# Patient Record
Sex: FEMALE | Race: WHITE | NOT HISPANIC OR LATINO | URBAN - METROPOLITAN AREA
[De-identification: names, ages, dates, MRNs, and addresses within clinical notes are randomized per-mention and may not be internally consistent; named-entity substitution may affect disease eponyms.]

---

## 2017-01-24 ENCOUNTER — AMBULATORY - HEALTHEAST (OUTPATIENT)
Dept: ALLERGY | Facility: CLINIC | Age: 37
End: 2017-01-24

## 2017-01-24 DIAGNOSIS — J30.1 ALLERGIC RHINITIS DUE TO POLLEN, UNSPECIFIED RHINITIS SEASONALITY: ICD-10-CM

## 2017-02-21 ENCOUNTER — AMBULATORY - HEALTHEAST (OUTPATIENT)
Dept: ALLERGY | Facility: CLINIC | Age: 37
End: 2017-02-21

## 2017-02-21 DIAGNOSIS — J30.1 ALLERGIC RHINITIS DUE TO POLLEN, UNSPECIFIED RHINITIS SEASONALITY: ICD-10-CM

## 2017-03-21 ENCOUNTER — OFFICE VISIT - HEALTHEAST (OUTPATIENT)
Dept: ALLERGY | Facility: CLINIC | Age: 37
End: 2017-03-21

## 2017-03-21 DIAGNOSIS — J30.1 ALLERGIC RHINITIS DUE TO POLLEN, UNSPECIFIED RHINITIS SEASONALITY: ICD-10-CM

## 2017-03-21 ASSESSMENT — MIFFLIN-ST. JEOR: SCORE: 1379.42

## 2017-04-18 ENCOUNTER — AMBULATORY - HEALTHEAST (OUTPATIENT)
Dept: ALLERGY | Facility: CLINIC | Age: 37
End: 2017-04-18

## 2017-04-18 DIAGNOSIS — J30.1 ALLERGIC RHINITIS DUE TO POLLEN, UNSPECIFIED RHINITIS SEASONALITY: ICD-10-CM

## 2017-05-02 ENCOUNTER — AMBULATORY - HEALTHEAST (OUTPATIENT)
Dept: ALLERGY | Facility: CLINIC | Age: 37
End: 2017-05-02

## 2017-05-02 DIAGNOSIS — J30.1 ALLERGIC RHINITIS DUE TO POLLEN, UNSPECIFIED RHINITIS SEASONALITY: ICD-10-CM

## 2017-05-04 ENCOUNTER — AMBULATORY - HEALTHEAST (OUTPATIENT)
Dept: ALLERGY | Facility: CLINIC | Age: 37
End: 2017-05-04

## 2017-05-04 DIAGNOSIS — J30.1 ALLERGIC RHINITIS DUE TO POLLEN, UNSPECIFIED RHINITIS SEASONALITY: ICD-10-CM

## 2017-05-30 ENCOUNTER — AMBULATORY - HEALTHEAST (OUTPATIENT)
Dept: ALLERGY | Facility: CLINIC | Age: 37
End: 2017-05-30

## 2017-05-30 DIAGNOSIS — J30.1 ALLERGIC RHINITIS DUE TO POLLEN, UNSPECIFIED RHINITIS SEASONALITY: ICD-10-CM

## 2017-06-13 ENCOUNTER — AMBULATORY - HEALTHEAST (OUTPATIENT)
Dept: ALLERGY | Facility: CLINIC | Age: 37
End: 2017-06-13

## 2017-06-13 DIAGNOSIS — J30.1 ALLERGIC RHINITIS DUE TO POLLEN, UNSPECIFIED RHINITIS SEASONALITY: ICD-10-CM

## 2017-07-11 ENCOUNTER — AMBULATORY - HEALTHEAST (OUTPATIENT)
Dept: ALLERGY | Facility: CLINIC | Age: 37
End: 2017-07-11

## 2017-07-11 DIAGNOSIS — J30.1 ALLERGIC RHINITIS DUE TO POLLEN, UNSPECIFIED RHINITIS SEASONALITY: ICD-10-CM

## 2017-08-04 ENCOUNTER — AMBULATORY - HEALTHEAST (OUTPATIENT)
Dept: ALLERGY | Facility: CLINIC | Age: 37
End: 2017-08-04

## 2017-08-04 DIAGNOSIS — J30.1 ALLERGIC RHINITIS DUE TO POLLEN, UNSPECIFIED RHINITIS SEASONALITY: ICD-10-CM

## 2017-08-29 ENCOUNTER — AMBULATORY - HEALTHEAST (OUTPATIENT)
Dept: ALLERGY | Facility: CLINIC | Age: 37
End: 2017-08-29

## 2017-08-29 DIAGNOSIS — J30.1 ALLERGIC RHINITIS DUE TO POLLEN, UNSPECIFIED RHINITIS SEASONALITY: ICD-10-CM

## 2017-09-24 ENCOUNTER — COMMUNICATION - HEALTHEAST (OUTPATIENT)
Dept: ALLERGY | Facility: CLINIC | Age: 37
End: 2017-09-24

## 2017-09-26 ENCOUNTER — OFFICE VISIT - HEALTHEAST (OUTPATIENT)
Dept: ALLERGY | Facility: CLINIC | Age: 37
End: 2017-09-26

## 2017-09-26 DIAGNOSIS — J30.89 ALLERGIC RHINITIS DUE TO OTHER ALLERGEN: ICD-10-CM

## 2017-09-26 DIAGNOSIS — R05.9 COUGH: ICD-10-CM

## 2017-09-29 ENCOUNTER — OFFICE VISIT - HEALTHEAST (OUTPATIENT)
Dept: FAMILY MEDICINE | Facility: CLINIC | Age: 37
End: 2017-09-29

## 2017-09-29 ENCOUNTER — COMMUNICATION - HEALTHEAST (OUTPATIENT)
Dept: SCHEDULING | Facility: CLINIC | Age: 37
End: 2017-09-29

## 2017-09-29 DIAGNOSIS — R05.9 COUGH: ICD-10-CM

## 2017-09-29 DIAGNOSIS — J40 BRONCHITIS: ICD-10-CM

## 2017-09-29 RX ORDER — ALBUTEROL SULFATE 0.83 MG/ML
2.5 SOLUTION RESPIRATORY (INHALATION) EVERY 4 HOURS PRN
Qty: 25 VIAL | Refills: 0 | Status: SHIPPED | OUTPATIENT
Start: 2017-09-29

## 2017-10-03 ENCOUNTER — OFFICE VISIT - HEALTHEAST (OUTPATIENT)
Dept: INTERNAL MEDICINE | Facility: CLINIC | Age: 37
End: 2017-10-03

## 2017-10-03 ENCOUNTER — RECORDS - HEALTHEAST (OUTPATIENT)
Dept: GENERAL RADIOLOGY | Facility: CLINIC | Age: 37
End: 2017-10-03

## 2017-10-03 DIAGNOSIS — J98.8 WHEEZING-ASSOCIATED RESPIRATORY INFECTION (WARI): ICD-10-CM

## 2017-10-03 DIAGNOSIS — R05.9 COUGH: ICD-10-CM

## 2017-10-03 DIAGNOSIS — J98.8 OTHER SPECIFIED RESPIRATORY DISORDERS: ICD-10-CM

## 2017-10-03 DIAGNOSIS — J18.9 PNEUMONIA: ICD-10-CM

## 2017-10-06 ENCOUNTER — OFFICE VISIT - HEALTHEAST (OUTPATIENT)
Dept: INTERNAL MEDICINE | Facility: CLINIC | Age: 37
End: 2017-10-06

## 2017-10-06 DIAGNOSIS — J45.901 ASTHMA EXACERBATION: ICD-10-CM

## 2017-10-13 ENCOUNTER — OFFICE VISIT - HEALTHEAST (OUTPATIENT)
Dept: ALLERGY | Facility: CLINIC | Age: 37
End: 2017-10-13

## 2017-10-13 ENCOUNTER — OFFICE VISIT - HEALTHEAST (OUTPATIENT)
Dept: INTERNAL MEDICINE | Facility: CLINIC | Age: 37
End: 2017-10-13

## 2017-10-13 DIAGNOSIS — R05.9 COUGH: ICD-10-CM

## 2017-10-13 DIAGNOSIS — J45.40 MODERATE PERSISTENT ASTHMA WITHOUT COMPLICATION: ICD-10-CM

## 2017-10-24 ENCOUNTER — OFFICE VISIT - HEALTHEAST (OUTPATIENT)
Dept: ALLERGY | Facility: CLINIC | Age: 37
End: 2017-10-24

## 2017-10-24 DIAGNOSIS — J30.9 ALLERGIC RHINITIS: ICD-10-CM

## 2017-10-24 DIAGNOSIS — J45.40 MODERATE PERSISTENT ASTHMA WITHOUT COMPLICATION: ICD-10-CM

## 2017-10-31 ENCOUNTER — AMBULATORY - HEALTHEAST (OUTPATIENT)
Dept: ALLERGY | Facility: CLINIC | Age: 37
End: 2017-10-31

## 2017-10-31 DIAGNOSIS — J30.9 ALLERGIC RHINITIS: ICD-10-CM

## 2017-11-28 ENCOUNTER — AMBULATORY - HEALTHEAST (OUTPATIENT)
Dept: ALLERGY | Facility: CLINIC | Age: 37
End: 2017-11-28

## 2017-11-28 DIAGNOSIS — J30.9 ALLERGIC RHINITIS: ICD-10-CM

## 2017-12-19 ENCOUNTER — AMBULATORY - HEALTHEAST (OUTPATIENT)
Dept: ALLERGY | Facility: CLINIC | Age: 37
End: 2017-12-19

## 2017-12-19 DIAGNOSIS — J30.9 ALLERGIC RHINITIS: ICD-10-CM

## 2018-01-16 ENCOUNTER — AMBULATORY - HEALTHEAST (OUTPATIENT)
Dept: ALLERGY | Facility: CLINIC | Age: 38
End: 2018-01-16

## 2018-01-16 DIAGNOSIS — J30.1 ALLERGIC RHINITIS DUE TO POLLEN, UNSPECIFIED CHRONICITY, UNSPECIFIED SEASONALITY: ICD-10-CM

## 2018-02-06 ENCOUNTER — COMMUNICATION - HEALTHEAST (OUTPATIENT)
Dept: ALLERGY | Facility: CLINIC | Age: 38
End: 2018-02-06

## 2018-02-16 ENCOUNTER — AMBULATORY - HEALTHEAST (OUTPATIENT)
Dept: ALLERGY | Facility: CLINIC | Age: 38
End: 2018-02-16

## 2018-02-16 DIAGNOSIS — J30.1 ALLERGIC RHINITIS DUE TO POLLEN, UNSPECIFIED CHRONICITY, UNSPECIFIED SEASONALITY: ICD-10-CM

## 2018-02-23 ENCOUNTER — COMMUNICATION - HEALTHEAST (OUTPATIENT)
Dept: ALLERGY | Facility: CLINIC | Age: 38
End: 2018-02-23

## 2018-03-16 ENCOUNTER — OFFICE VISIT - HEALTHEAST (OUTPATIENT)
Dept: ALLERGY | Facility: CLINIC | Age: 38
End: 2018-03-16

## 2018-03-16 DIAGNOSIS — J45.20 MILD INTERMITTENT ASTHMA WITHOUT COMPLICATION: ICD-10-CM

## 2018-03-16 DIAGNOSIS — J30.1 ALLERGIC RHINITIS DUE TO POLLEN, UNSPECIFIED CHRONICITY, UNSPECIFIED SEASONALITY: ICD-10-CM

## 2018-03-16 ASSESSMENT — MIFFLIN-ST. JEOR: SCORE: 1415.71

## 2018-04-16 ENCOUNTER — AMBULATORY - HEALTHEAST (OUTPATIENT)
Dept: ALLERGY | Facility: CLINIC | Age: 38
End: 2018-04-16

## 2018-04-16 DIAGNOSIS — J30.1 ALLERGIC RHINITIS DUE TO POLLEN, UNSPECIFIED CHRONICITY, UNSPECIFIED SEASONALITY: ICD-10-CM

## 2018-07-11 ENCOUNTER — OFFICE VISIT - HEALTHEAST (OUTPATIENT)
Dept: FAMILY MEDICINE | Facility: CLINIC | Age: 38
End: 2018-07-11

## 2018-07-11 DIAGNOSIS — R10.32 LLQ ABDOMINAL PAIN: ICD-10-CM

## 2018-07-11 LAB
ALBUMIN UR-MCNC: NEGATIVE MG/DL
APPEARANCE UR: CLEAR
BACTERIA #/AREA URNS HPF: ABNORMAL HPF
BASOPHILS # BLD AUTO: 0.1 THOU/UL (ref 0–0.2)
BASOPHILS NFR BLD AUTO: 1 % (ref 0–2)
BILIRUB UR QL STRIP: NEGATIVE
COLOR UR AUTO: YELLOW
EOSINOPHIL # BLD AUTO: 0.2 THOU/UL (ref 0–0.4)
EOSINOPHIL NFR BLD AUTO: 2 % (ref 0–6)
ERYTHROCYTE [DISTWIDTH] IN BLOOD BY AUTOMATED COUNT: 11.7 % (ref 11–14.5)
GLUCOSE UR STRIP-MCNC: NEGATIVE MG/DL
HCG UR QL: NEGATIVE
HCT VFR BLD AUTO: 41.1 % (ref 35–47)
HGB BLD-MCNC: 13.7 G/DL (ref 12–16)
HGB UR QL STRIP: ABNORMAL
KETONES UR STRIP-MCNC: NEGATIVE MG/DL
LEUKOCYTE ESTERASE UR QL STRIP: NEGATIVE
LYMPHOCYTES # BLD AUTO: 2.9 THOU/UL (ref 0.8–4.4)
LYMPHOCYTES NFR BLD AUTO: 23 % (ref 20–40)
MCH RBC QN AUTO: 29.2 PG (ref 27–34)
MCHC RBC AUTO-ENTMCNC: 33.4 G/DL (ref 32–36)
MCV RBC AUTO: 88 FL (ref 80–100)
MONOCYTES # BLD AUTO: 1.1 THOU/UL (ref 0–0.9)
MONOCYTES NFR BLD AUTO: 9 % (ref 2–10)
NEUTROPHILS # BLD AUTO: 8.4 THOU/UL (ref 2–7.7)
NEUTROPHILS NFR BLD AUTO: 66 % (ref 50–70)
NITRATE UR QL: NEGATIVE
PH UR STRIP: 6 [PH] (ref 5–8)
PLATELET # BLD AUTO: 429 THOU/UL (ref 140–440)
PMV BLD AUTO: 7 FL (ref 7–10)
RBC # BLD AUTO: 4.69 MILL/UL (ref 3.8–5.4)
RBC #/AREA URNS AUTO: ABNORMAL HPF
SP GR UR STRIP: <=1.005 (ref 1–1.03)
SP GR UR STRIP: <=1.005 (ref 1–1.03)
SQUAMOUS #/AREA URNS AUTO: ABNORMAL LPF
UROBILINOGEN UR STRIP-ACNC: ABNORMAL
WBC #/AREA URNS AUTO: ABNORMAL HPF
WBC: 12.8 THOU/UL (ref 4–11)

## 2018-07-12 ENCOUNTER — OFFICE VISIT - HEALTHEAST (OUTPATIENT)
Dept: FAMILY MEDICINE | Facility: CLINIC | Age: 38
End: 2018-07-12

## 2018-07-12 ENCOUNTER — HOSPITAL ENCOUNTER (OUTPATIENT)
Dept: CT IMAGING | Facility: CLINIC | Age: 38
Discharge: HOME | End: 2018-07-12

## 2018-07-12 DIAGNOSIS — K57.30 DIVERTICULOSIS OF LARGE INTESTINE: ICD-10-CM

## 2018-07-12 DIAGNOSIS — R10.32 LLQ ABDOMINAL PAIN: ICD-10-CM

## 2018-10-11 ENCOUNTER — COMMUNICATION - HEALTHEAST (OUTPATIENT)
Dept: ALLERGY | Facility: CLINIC | Age: 38
End: 2018-10-11

## 2018-10-11 DIAGNOSIS — R05.9 COUGH: ICD-10-CM

## 2018-10-15 RX ORDER — ALBUTEROL SULFATE 90 UG/1
AEROSOL, METERED RESPIRATORY (INHALATION)
Qty: 18 INHALER | Refills: 0 | Status: SHIPPED | OUTPATIENT
Start: 2018-10-15

## 2018-12-21 ENCOUNTER — OFFICE VISIT - HEALTHEAST (OUTPATIENT)
Dept: FAMILY MEDICINE | Facility: CLINIC | Age: 38
End: 2018-12-21

## 2018-12-21 DIAGNOSIS — R07.0 THROAT PAIN: ICD-10-CM

## 2018-12-21 LAB — DEPRECATED S PYO AG THROAT QL EIA: NORMAL

## 2018-12-22 LAB — GROUP A STREP BY PCR: NORMAL

## 2018-12-27 ENCOUNTER — OFFICE VISIT - HEALTHEAST (OUTPATIENT)
Dept: FAMILY MEDICINE | Facility: CLINIC | Age: 38
End: 2018-12-27

## 2018-12-27 DIAGNOSIS — H65.02 ACUTE SEROUS OTITIS MEDIA OF LEFT EAR, RECURRENCE NOT SPECIFIED: ICD-10-CM

## 2018-12-29 ENCOUNTER — OFFICE VISIT - HEALTHEAST (OUTPATIENT)
Dept: FAMILY MEDICINE | Facility: CLINIC | Age: 38
End: 2018-12-29

## 2018-12-29 DIAGNOSIS — H65.92 OME (OTITIS MEDIA WITH EFFUSION), LEFT: ICD-10-CM

## 2019-03-19 ENCOUNTER — RECORDS - HEALTHEAST (OUTPATIENT)
Dept: LAB | Facility: CLINIC | Age: 39
End: 2019-03-19

## 2019-03-20 LAB
CHOLEST SERPL-MCNC: 174 MG/DL
FASTING STATUS PATIENT QL REPORTED: NO
HDLC SERPL-MCNC: 52 MG/DL
LDLC SERPL CALC-MCNC: 66 MG/DL
TRIGL SERPL-MCNC: 278 MG/DL

## 2019-03-21 LAB
HPV SOURCE: NORMAL
HUMAN PAPILLOMA VIRUS 16 DNA: NEGATIVE
HUMAN PAPILLOMA VIRUS 18 DNA: NEGATIVE
HUMAN PAPILLOMA VIRUS FINAL DIAGNOSIS: NORMAL
HUMAN PAPILLOMA VIRUS OTHER HR: NEGATIVE
SPECIMEN DESCRIPTION: NORMAL

## 2019-03-27 LAB
BKR LAB AP ABNORMAL BLEEDING: NO
BKR LAB AP BIRTH CONTROL/HORMONES: NORMAL
BKR LAB AP CERVICAL APPEARANCE: NORMAL
BKR LAB AP GYN ADEQUACY: NORMAL
BKR LAB AP GYN INTERPRETATION: NORMAL
BKR LAB AP HPV REFLEX: NORMAL
BKR LAB AP LMP: NORMAL
BKR LAB AP PATIENT STATUS: NO
BKR LAB AP PREVIOUS ABNORMAL: NO
BKR LAB AP PREVIOUS NORMAL: NORMAL
HIGH RISK?: NO
PATH REPORT.COMMENTS IMP SPEC: NORMAL
RESULT FLAG (HE HISTORICAL CONVERSION): NORMAL

## 2020-09-02 ENCOUNTER — AMBULATORY - HEALTHEAST (OUTPATIENT)
Dept: NURSING | Facility: CLINIC | Age: 40
End: 2020-09-02

## 2021-05-30 VITALS — WEIGHT: 161 LBS | BODY MASS INDEX: 28.53 KG/M2 | HEIGHT: 63 IN

## 2021-05-31 VITALS — WEIGHT: 165 LBS | BODY MASS INDEX: 29.23 KG/M2

## 2021-05-31 VITALS — WEIGHT: 164 LBS | BODY MASS INDEX: 29.05 KG/M2

## 2021-05-31 VITALS — WEIGHT: 165.4 LBS | BODY MASS INDEX: 29.3 KG/M2

## 2021-05-31 VITALS — WEIGHT: 163 LBS | BODY MASS INDEX: 28.87 KG/M2

## 2021-05-31 VITALS — WEIGHT: 161 LBS | BODY MASS INDEX: 28.52 KG/M2

## 2021-06-01 VITALS — HEIGHT: 63 IN | BODY MASS INDEX: 29.95 KG/M2 | WEIGHT: 169 LBS

## 2021-06-01 VITALS — BODY MASS INDEX: 30.63 KG/M2 | WEIGHT: 172.9 LBS

## 2021-06-01 VITALS — BODY MASS INDEX: 30.47 KG/M2 | WEIGHT: 172 LBS

## 2021-06-02 VITALS — BODY MASS INDEX: 31.09 KG/M2 | WEIGHT: 175.5 LBS

## 2021-06-02 VITALS — WEIGHT: 178 LBS | BODY MASS INDEX: 31.53 KG/M2

## 2021-06-02 VITALS — WEIGHT: 171 LBS | BODY MASS INDEX: 30.29 KG/M2

## 2021-06-09 NOTE — PROGRESS NOTES
"Chief complaint: Follow-up allergy shots    History of present illness: This is a pleasant 36-year-old woman who is here today for follow-up of allergy shots.  She has been on allergy shots for 3 years.  She has not had a systemic reaction in almost 2 years.  She does not use allergy medication regularly but does have Claritin and Singulair to use at home in case symptoms were to return.  She continues to have some baseline nasal congestion but does not use any medication for this.  She denies any cough, wheeze or shortness of breath.  Symptoms have improved where she was a few years ago, however.    Past medical history, social history, family medical history, meds and allergies reviewed and updated accordingly.      Review of Systems performed as above and the remainder is negative.        Current Outpatient Prescriptions:      DESONIDE (DESONATE TOP), Apply topically., Disp: , Rfl:      EPINEPHrine (EPIPEN 2-SARAY) 0.3 mg/0.3 mL (1:1,000) atIn, MICHAEL   Inject intramuscularly as directed, Disp: , Rfl:      GLUCOSAMINE HCL/MSM (GLUCOSAMINE MSM ORAL), Take 1 tablet by mouth., Disp: , Rfl:      hydrocortisone-pramoxine (ANALPRAM-HC) 2.5-1 % rectal cream, , Disp: , Rfl:      loratadine (CLARITIN) 10 mg tablet, Take 10 mg by mouth as needed for allergies., Disp: , Rfl:      montelukast (SINGULAIR) 10 mg tablet, Take 1 tablet (10 mg total) by mouth bedtime., Disp: 30 tablet, Rfl: 5     tacrolimus (PROTOPIC) 0.03 % ointment, , Disp: , Rfl: 4     terbinafine HCl (LAMISIL) 250 mg tablet, , Disp: , Rfl: 0     triamcinolone (KENALOG) 0.1 % ointment, , Disp: , Rfl: 1    Current Facility-Administered Medications:      albuterol nebulizer solution 3 mL (PROVENTIL), 3 mL, Nebulization, Once, Dora Franco MD    No Known Allergies    Visit Vitals     Resp 18     Ht 5' 3\" (1.6 m)     Wt 161 lb (73 kg)     BMI 28.52 kg/m2     Gen:  Pleasant female not in acute distress  HEENT:  Eyes no erythema of the bulbar or palpebral " conjunctiva, no edema.  Ears:  TMs well visualized, no effusions.  Nose:  Clear, no congestion, normal mucosa Mouth:  Throat clear, no lip or tongue edema.    Cardiac:  Regular rate and rhythm, no murmurs, rubs or gallops  Respiratory:  Clear to auscultation bilaterally, no adventitious breath sounds    Skin:  No rashes or lesions  Psych:  Alert and oriented times 3    Impression report and plan:  1.  Allergic rhinitis    Continue allergy shots.  Follow-up in 1 year.  The importance of not taking 56 day breaks from allergy shots was discussed.  Notify of systemic reaction.  Okay to restart Singulair if needed.  Otherwise, consider nasal rinses.

## 2021-06-10 NOTE — PROGRESS NOTES
DFM/DPM/RW  1:1 V/V EXP 5/4/2018  POLLENS/CAT  1:1 V/V EXP 5/4/2018  TREES  1:1 V/V EXP 5/4/2018    HE WBY  CHECKED BY HOLGER  CHARGED 150 UNITS- 0.1 H.D.

## 2021-06-13 NOTE — PROGRESS NOTES
ASSESSMENT and PLAN:  1. Cough  Her cough continues.  I believe I see an infiltrate in the right lower lung field on chest x-ray.  We will treat her for pneumonia as well.  I am also adding a steroid inhaler.  - XR Chest PA and Lateral; Future  - fluticasone (FLOVENT HFA) 110 mcg/actuation inhaler; Inhale 2 puffs 2 (two) times a day.  Dispense: 1 Inhaler; Refill: 1    2. Wheezing-associated respiratory infection (WARI)  I am adding a steroid inhaler.  We will plan short-term follow-up.  We discussed signs and symptoms that would require urgent reevaluation.  - XR Chest PA and Lateral; Future  - fluticasone (FLOVENT HFA) 110 mcg/actuation inhaler; Inhale 2 puffs 2 (two) times a day.  Dispense: 1 Inhaler; Refill: 1    3. Pneumonia  I reviewed the x-ray.  I believe I see a small infiltrate in the right lower lobe.  We will treat her for pneumonia.  She has been treated with azithromycin.  I will use Levaquin.  She has a Mirena IUD so this should not be a problem for her.  She will continue using her albuterol as needed and I am adding Flovent as well.  We plan for short-term follow-up.  - levoFLOXacin (LEVAQUIN) 500 MG tablet; Take 1 tablet (500 mg total) by mouth daily for 7 days.  Dispense: 7 tablet; Refill: 0      There are no discontinued medications.  Administrations This Visit     albuterol nebulizer solution 3 mL (PROVENTIL)     Admin Date Action Dose Route Administered By             10/03/2017 Given 3 mL Nebulization Katerine Antonio CMA                        No Follow-up on file.    Patient Instructions   I sent in Levaquin, and antibiotic for you.  It is once a day for 7 days.    I am adding Flovent to your medication regimen.  It is an inhaler.  He take it twice daily.  You can continue to use her albuterol inhaler or nebulizer as needed as well.    If at any time, you are feeling worse than either get an appointment or go to the emergency room or call 911 depending how severe your symptoms are.    Please  follow-up with either me, Vikram Eubanks (our internal medicine nurse practitioner), or Dr. Franco.  I would like you seen later this week or early next week.      CHIEF COMPLAINT:  Chief Complaint   Patient presents with     Cough     recently treated not improving, shortness of breath worsening       HISTORY OF PRESENT ILLNESS:  Lori J Foix is a 36 y.o. female  presenting to the clinic today for a cough.  She saw Dr. Franco was  for this.  She was treated w/ oral prednisone.  She was seen at the [facility] on  for this.  She was treated w/ albuterol nebs and a zpak.   I reviewed both of those notes.  She has not gotten better, in fact she is getting worse.  She feels very short of breath.  Her chest feels tight.  She does not have any pleuritic pain.  She is having trouble sleeping.  This is affecting her at work.  She mentions that she was written up at work and she fears she may lose her job.  An added stressor is that her father-in-law  last week.  She had never met him.  She and her  had to put his affairs in order.  They have some of his paperwork in her house and it smells of smoke.  Her son was ill with similar symptoms.  When she takes her albuterol nebulizer, it helps her for a short time.    She does not have a long history of asthma.  She said that she was diagnosed with wheezing prior to a pregnancy.  She has never been intubated for asthma.    REVIEW OF SYSTEMS:   Pscyh: she's feeling stressed by her work   All other systems are negative.    PFSH:  She's   She has a Mirena IUD for contraception      TOBACCO USE:  History   Smoking Status     Never Smoker   Smokeless Tobacco     Never Used       VITALS:  Vitals:    10/03/17 1259   BP: 108/62   Patient Site: Right Arm   Patient Position: Sitting   Cuff Size: Adult Regular   Pulse: (!) 125   Temp: 98.5  F (36.9  C)   TempSrc: Oral   SpO2: 98%   Weight: 161 lb (73 kg)     Wt Readings from Last 3 Encounters:   10/03/17 161 lb (73 kg)   17  163 lb (73.9 kg)   03/21/17 161 lb (73 kg)         PHYSICAL EXAM:  Constitutional:  Reveals an alert, pleasant adult female.,  She appears fatigued  Vitals:  Noted.   Lungs: Coarse breath sounds, worse in the left lower lung fields.  She has expiratory wheezing throughout.  Deep breathing provokes a cough.  Her exam was improved following administration of an albuterol nebulizer.   Heart: Tachycardic rate and normal rhythm, S1 and S2 normal, no murmur, rub, or gallop,   Extremities: Extremities normal, atraumatic, no cyanosis or edema   Skin: Skin color, texture, turgor normal, no rashes or lesions on exposed skin    QUALITY MEASURES:  The following high BMI interventions were performed this visit: weight monitoring    MEDICATIONS:  Current Outpatient Prescriptions   Medication Sig Dispense Refill     albuterol (PROAIR HFA;PROVENTIL HFA;VENTOLIN HFA) 90 mcg/actuation inhaler Inhale 2 puffs every 6 (six) hours as needed for wheezing. 1 Inhaler 0     albuterol (PROVENTIL) 2.5 mg /3 mL (0.083 %) nebulizer solution Take 3 mL (2.5 mg total) by nebulization every 4 (four) hours as needed for wheezing. 25 vial 0     azithromycin (ZITHROMAX Z-SARAY) 250 MG tablet Take 2 tablets (500 mg) on  Day 1,  followed by 1 tablet (250 mg) once daily on Days 2 through 5. 6 tablet 0     benzonatate (TESSALON) 200 MG capsule Take 1 capsule (200 mg total) by mouth 3 (three) times a day as needed for cough. 30 capsule 0     DESONIDE (DESONATE TOP) Apply topically.       EPINEPHrine (EPIPEN 2-SARAY) 0.3 mg/0.3 mL (1:1,000) atIn MICHAEL    Inject intramuscularly as directed       GLUCOSAMINE HCL/MSM (GLUCOSAMINE MSM ORAL) Take 1 tablet by mouth.       hydrocortisone-pramoxine (ANALPRAM-HC) 2.5-1 % rectal cream        loratadine (CLARITIN) 10 mg tablet Take 10 mg by mouth as needed for allergies.       montelukast (SINGULAIR) 10 mg tablet Take 1 tablet (10 mg total) by mouth bedtime. 30 tablet 5     tacrolimus (PROTOPIC) 0.03 % ointment   4      terbinafine HCl (LAMISIL) 250 mg tablet   0     triamcinolone (KENALOG) 0.1 % ointment   1     fluticasone (FLOVENT HFA) 110 mcg/actuation inhaler Inhale 2 puffs 2 (two) times a day. 1 Inhaler 1     levoFLOXacin (LEVAQUIN) 500 MG tablet Take 1 tablet (500 mg total) by mouth daily for 7 days. 7 tablet 0     Current Facility-Administered Medications   Medication Dose Route Frequency Provider Last Rate Last Dose     albuterol nebulizer solution 3 mL (PROVENTIL)  3 mL Nebulization Once Lisa Betancourt MD

## 2021-06-13 NOTE — PROGRESS NOTES
Chief complaint: Cough    History of present illness: This is a pleasant 36-year-old woman with history of allergic rhinitis is currently on allergen immunotherapy is here today and she has been sick for the last few weeks with cough.  She states that her son was also sick.  She has had a persistent cough.  She states it is worse at night.  She is noted in the morning as well.  She will have coughing episodes.  She is not sure she is short of breath but feels that she does have some tightness in her chest.  She denies wheezing.  She is coughing in her sleep some.  She states a few years ago we had given her an albuterol inhaler.  She had a similar illness at that time and the inhaler helps to recover.  She does not have a current albuterol inhaler.  She continues on Claritin.  She notes that she has not had any itchy eyes or sneezing.  No fevers.    Past medical history, social history, family medical history, meds and allergies reviewed and updated accordingly.    Review of Systems performed as above and the remainder is negative.        Current Outpatient Prescriptions:      DESONIDE (DESONATE TOP), Apply topically., Disp: , Rfl:      EPINEPHrine (EPIPEN 2-SARAY) 0.3 mg/0.3 mL (1:1,000) atIn, MICHAEL   Inject intramuscularly as directed, Disp: , Rfl:      GLUCOSAMINE HCL/MSM (GLUCOSAMINE MSM ORAL), Take 1 tablet by mouth., Disp: , Rfl:      hydrocortisone-pramoxine (ANALPRAM-HC) 2.5-1 % rectal cream, , Disp: , Rfl:      loratadine (CLARITIN) 10 mg tablet, Take 10 mg by mouth as needed for allergies., Disp: , Rfl:      montelukast (SINGULAIR) 10 mg tablet, Take 1 tablet (10 mg total) by mouth bedtime., Disp: 30 tablet, Rfl: 5     tacrolimus (PROTOPIC) 0.03 % ointment, , Disp: , Rfl: 4     terbinafine HCl (LAMISIL) 250 mg tablet, , Disp: , Rfl: 0     triamcinolone (KENALOG) 0.1 % ointment, , Disp: , Rfl: 1     albuterol (PROAIR HFA;PROVENTIL HFA;VENTOLIN HFA) 90 mcg/actuation inhaler, Inhale 2 puffs every 6 (six) hours  as needed for wheezing., Disp: 1 Inhaler, Rfl: 0     predniSONE (DELTASONE) 20 MG tablet, Take 1 tablet (20 mg total) by mouth 2 (two) times a day for 5 days., Disp: 10 tablet, Rfl: 0    Current Facility-Administered Medications:      albuterol nebulizer solution 3 mL (PROVENTIL), 3 mL, Nebulization, Once, Dora Franco MD    No Known Allergies    /90  Pulse (!) 106  Resp 14  Gen: Pleasant female not in acute distress  HEENT: Eyes no erythema of the bulbar or palpebral conjunctiva, no edema. Ears: TMs well visualized, no effusions. Nose: No congestion, mucosa normal. Mouth: Throat clear, no lip or tongue edema.   Cardiac: Regular rate and rhythm, no murmurs, rubs or gallops  Respiratory: Clear to auscultation bilaterally, no adventitious breath sounds    Skin: No rashes or lesions  Psych: Alert and oriented times 3    FENO:  23    Impression report and plan:  1.  Cough  2.  Allergic rhinitis  3.  Upper respiratory tract illness    Would like to give her prednisone 20 mg twice daily for 3-5 days.  I cautioned her to the side effects of prednisone.  I would also give her an albuterol inhaler to use every 4 hours as needed.  If she is not noting much improvement in symptoms by Friday, add a controller medication.  Follow as previously scheduled.  We denied her allergy shot today.

## 2021-06-13 NOTE — PROGRESS NOTES
Clinic Note    Assessment:     Assessment and Plan:    1. Asthma exacerbation    Pt has audible expiratory wheezing on exam. No rales. I think given her hx of asthma and allergies as well as her negative CXR that she is dealing with an asthma flare. Going to increase the dose of prednisone for 60mg burst for five days while she continues the Flovent and Albuterol Nebs. Told her to use OTC flonase as well. Gave her a note for work today so that she could rest. Want to see her next week for follow-up.        Patient Instructions   Buy Flonase over the counter and use as directed on the label.     Take the prednisone for five days.     Consider using a martínez-pot again.     Use your nebulizer twice per day.     Take three tablets of prednisone daily for five days.              Subjective:      Lori J Foix is a 36 y.o. female who is here with a cough. She was seen by Dr. Lisa Betancourt on 10/03 and started on Flovent and Levaquin after Dr. Betancourt suspected a possible pneumonia after ordering a chest x-ray. Patient says that since that visit she feels only modestly better. Only getting two hours of sleep per night. Feels a lot of drainage down back of throat. Having some wheezing. Says that she was diagnosed with asthma when she was younger. Lots of seasonal allergies. No fevers. Cough is productive. No chest pain. No nausea vomiting or diarrhea.     Has been using the Flovent as well as Albuterol Nebulizer. Says that she saw Dr. Franco a couple of weeks ago and was given steroid pill. The cough has been going on for about three weeks now.     The following portions of the patient's history were reviewed and updated as appropriate: Allergies, Medications, Problem List.     Review of Systems:    Review is negative except for what is mentioned above.     Social Hx:    History   Smoking Status     Never Smoker   Smokeless Tobacco     Never Used         Objective:     Vitals:    10/06/17 0901   BP: 124/76   Patient Site: Right  Arm   Patient Position: Sitting   Cuff Size: Adult Regular   Pulse: 92   Temp: 98.5  F (36.9  C)   TempSrc: Oral   SpO2: 97%   Weight: 164 lb (74.4 kg)       Exam:    General: Mild distress. Calm. Alert and Oriented X3. Pt behavior is appropriate.  Head:Atraumatic. Normocephalic, non-tender to palpation  Neck: Supple. No JVD. Full ROM. No adenopathy  Eyes: PERRL, No discharge. No strabismus. No nystagmus.  Ears: TMs pearly gray with landmarks visible.   Nose/Mouth/Throat: Patent nares, no oral lesions, pharynx clear and without exudate. Uvula mid-line. Nasal septum mid-line. Clear turbinates.   Lymph: No axillar or cervical adenopathy.   Chest/Lungs: Normal chest wall, expiratory wheezes in bilateral lower lobes, no rales, normal respiratory effort and rate.   Heart/Pulses: Regular rate and rhythm, strong and equal radial pulses, no murmurs. Capillary refill <2 seconds. No edema.   Musculoskeletal: No CVA tenderness with palpation. Good ROM with extremities.   Neurologic: Interactive, alert, no focal findings, CNs intact.   Skin: Warm, dry. Normal hair pattern. Free of lesions. Normal skin turgor.       Patient Active Problem List   Diagnosis     Allergic Rhinitis     Anxiety     Current Outpatient Prescriptions   Medication Sig Dispense Refill     albuterol (PROAIR HFA;PROVENTIL HFA;VENTOLIN HFA) 90 mcg/actuation inhaler Inhale 2 puffs every 6 (six) hours as needed for wheezing. 1 Inhaler 0     albuterol (PROVENTIL) 2.5 mg /3 mL (0.083 %) nebulizer solution Take 3 mL (2.5 mg total) by nebulization every 4 (four) hours as needed for wheezing. 25 vial 0     benzonatate (TESSALON) 200 MG capsule Take 1 capsule (200 mg total) by mouth 3 (three) times a day as needed for cough. 30 capsule 0     DESONIDE (DESONATE TOP) Apply topically.       EPINEPHrine (EPIPEN 2-SARAY) 0.3 mg/0.3 mL (1:1,000) atIn MICHAEL    Inject intramuscularly as directed       fluticasone (FLOVENT HFA) 110 mcg/actuation inhaler Inhale 2 puffs 2 (two)  times a day. 1 Inhaler 1     GLUCOSAMINE HCL/MSM (GLUCOSAMINE MSM ORAL) Take 1 tablet by mouth.       hydrocortisone-pramoxine (ANALPRAM-HC) 2.5-1 % rectal cream        levoFLOXacin (LEVAQUIN) 500 MG tablet Take 1 tablet (500 mg total) by mouth daily for 7 days. 7 tablet 0     loratadine (CLARITIN) 10 mg tablet Take 10 mg by mouth as needed for allergies.       tacrolimus (PROTOPIC) 0.03 % ointment   4     terbinafine HCl (LAMISIL) 250 mg tablet   0     triamcinolone (KENALOG) 0.1 % ointment   1     montelukast (SINGULAIR) 10 mg tablet Take 1 tablet (10 mg total) by mouth bedtime. 30 tablet 5     Current Facility-Administered Medications   Medication Dose Route Frequency Provider Last Rate Last Dose     albuterol nebulizer solution 3 mL (PROVENTIL)  3 mL Nebulization Once Lisa Betancourt MD           Total time spent with patient was 20 minutes with >50% of time spent in face-to-face counseling regarding the above plan       Darryl Eubanks CNP (Rob)    10/6/2017

## 2021-06-13 NOTE — PROGRESS NOTES
Chief complaint: Follow-up asthma    History of present illness: This is a pleasant 37-year-old woman who is here today for follow-up of asthma.  I saw her almost 2 weeks ago.  At that time started her on Dulera 200-5 2 puffs twice daily.  She reports she still has a slight cough but symptoms have significantly improved.  She has less nasal congestion and drainage.  She continues on Flonase.  She is here today to possibly get an allergy shot.  She has not had a shot since the end of August due to her illness.  She is still using her albuterol inhaler.  She used it twice yesterday.  She reports the use has decreased quite a bit.    Past medical history, social history, family medical history, meds and allergies reviewed and updated accordingly.    Review of Systems performed as above and the remainder is negative.      Current Outpatient Prescriptions:      albuterol (PROAIR HFA;PROVENTIL HFA;VENTOLIN HFA) 90 mcg/actuation inhaler, Inhale 2 puffs every 6 (six) hours as needed for wheezing., Disp: 1 Inhaler, Rfl: 0     albuterol (PROVENTIL) 2.5 mg /3 mL (0.083 %) nebulizer solution, Take 3 mL (2.5 mg total) by nebulization every 4 (four) hours as needed for wheezing., Disp: 25 vial, Rfl: 0     DESONIDE (DESONATE TOP), Apply topically., Disp: , Rfl:      EPINEPHrine (EPIPEN 2-SARAY) 0.3 mg/0.3 mL (1:1,000) atIn, MICHAEL   Inject intramuscularly as directed, Disp: , Rfl:      GLUCOSAMINE HCL/MSM (GLUCOSAMINE MSM ORAL), Take 1 tablet by mouth., Disp: , Rfl:      hydrocortisone-pramoxine (ANALPRAM-HC) 2.5-1 % rectal cream, , Disp: , Rfl:      loratadine (CLARITIN) 10 mg tablet, Take 10 mg by mouth as needed for allergies., Disp: , Rfl:      mometasone-formoterol (DULERA) 200-5 mcg/actuation HFAA inhaler, Inhale 2 puffs 2 (two) times a day., Disp: 1 Inhaler, Rfl: 1     montelukast (SINGULAIR) 10 mg tablet, Take 1 tablet (10 mg total) by mouth bedtime., Disp: 30 tablet, Rfl: 5     tacrolimus (PROTOPIC) 0.03 % ointment, , Disp: ,  Rfl: 4     terbinafine HCl (LAMISIL) 250 mg tablet, , Disp: , Rfl: 0     triamcinolone (KENALOG) 0.1 % ointment, , Disp: , Rfl: 1    Current Facility-Administered Medications:      albuterol nebulizer solution 3 mL (PROVENTIL), 3 mL, Nebulization, Once, Lisa Betancourt MD    No Known Allergies    /70  Pulse 80  Resp 17  Gen: Pleasant female not in acute distress  HEENT: Eyes no erythema of the bulbar or palpebral conjunctiva, no edema. Ears: TMs well visualized, no effusions. Nose: No congestion, mucosa normal. Mouth: Throat clear, no lip or tongue edema.   Cardiac: Regular rate and rhythm, no murmurs, rubs or gallops  Respiratory: Clear to auscultation bilaterally, no adventitious breath sounds    Skin: No rashes or lesions  Psych: Alert and oriented times 3    Spirometry is performed.  Difficult to obtain reproducible matches due to the patient coughing.  FEV1 to FVC ratio 92%.  FEV1 2.73 L or 90% predicted.  FVC 2.97 L or 82% of predicted.    Impression report and plan:  1.  Mild persistent asthma  2.  Allergic rhinitis    I would like her to continue on Dulera given her symptoms have improved.  We had difficulty obtaining spirometry today but I would like to see her in 3 months.  At that time, repeat breathing tests.  She will notify me if symptoms worsen.  If symptoms do worsen, I would obtain CT scan of the sinuses I suspect this is playing a role.

## 2021-06-13 NOTE — PROGRESS NOTES
Clinic Note    Assessment:     Assessment and Plan:    1. Cough    Her symptoms are improved today but she is still having some coughing. No more wheezing. She needs to be better about using her ICS every day. I think that if she uses her Flonase every day in the morning after a sinus rinse she will see better symptom control for her Post nasal drip and sinus congestion. Patient has an appointment today with her allergist Dr. Franco; I think it would be prudent to have a discussion with her about PFT/spirometry to establish a formal asthma diagnosis moving forward.      Patient Instructions   Talk with Dr. Franco about if she thinks that you are a candidate for spirometry testing in the future to establish an asthma diagnoses.     Use the Baylee-pot twice daily. Use the Flonase in the morning after using the Baylee-pot.     Use the Flovent twice a day every day.     If you are not feeling better in two weeks I would like to see you back.              Subjective:      Lori J Foix is a 37 y.o. female who is here for follow-up her cough. She was seen by me on 10/06 and I diagnosed her with an asthma-exacerbation. I started her on prednisone and told her to continue using her Flovent and albuterol nebs and told her that she should follow-up with me in one week.     Today, she says that she is feeling better. She says that she has been using the steroid inhaler intermittently- she says that she sees some relief when she uses it. She says that she has never had pulmonary function testing in the past; was maybe diagnosed with asthma when she was little.     She is sleeping better now. She is a  and says that she is unsure if she is exposed to mold, or chemicals at work on a regular basis.  Still having some post nasal drip that is bothersome for her. She has been using Claritin every day. Still having a little bit of wheezing. Denies fevers. No N/V/D.     The following portions of the patient's history were reviewed  and updated as appropriate: Allergies, Medications, Problem List     Review of Systems:    Review is negative except for what is mentioned above.     Social Hx:    History   Smoking Status     Never Smoker   Smokeless Tobacco     Never Used         Objective:     Vitals:    10/13/17 0902   BP: 124/68   Patient Site: Right Arm   Patient Position: Sitting   Cuff Size: Adult Regular   Pulse: 100   Temp: 98.1  F (36.7  C)   TempSrc: Oral   SpO2: 98%   Weight: 165 lb 6.4 oz (75 kg)       Exam:    General: No apparent distress. Calm. Alert and Oriented X3. Pt behavior is appropriate.  Head:Atraumatic. Normocephalic, non-tender to palpation  Neck: Supple. No JVD. Full ROM. No adenopathy  Eyes: PERRL, No discharge. No strabismus. No nystagmus.  Ears: TMs pearly gray with landmarks visible.   Nose/Mouth/Throat: Patent nares, no oral lesions, pharynx clear and without exudate. Uvula mid-line. Nasal septum mid-line. Clear turbinates.   Lymph: No axillar or cervical adenopathy.   Chest/Lungs: Normal chest wall, clear to auscultation, normal respiratory effort and rate.   Heart/Pulses: Regular rate and rhythm, strong and equal radial pulses, no murmurs. Capillary refill <2 seconds. No edema.   Musculoskeletal: No CVA tenderness with palpation. Good ROM with extremities.   Neurologic: Interactive, alert, no focal findings, CNs intact.   Skin: Warm, dry. Normal hair pattern. Free of lesions. Normal skin turgor.       Patient Active Problem List   Diagnosis     Allergic Rhinitis     Anxiety     Current Outpatient Prescriptions   Medication Sig Dispense Refill     albuterol (PROAIR HFA;PROVENTIL HFA;VENTOLIN HFA) 90 mcg/actuation inhaler Inhale 2 puffs every 6 (six) hours as needed for wheezing. 1 Inhaler 0     albuterol (PROVENTIL) 2.5 mg /3 mL (0.083 %) nebulizer solution Take 3 mL (2.5 mg total) by nebulization every 4 (four) hours as needed for wheezing. 25 vial 0     DESONIDE (DESONATE TOP) Apply topically.       EPINEPHrine  (EPIPEN 2-SARAY) 0.3 mg/0.3 mL (1:1,000) atIn MICHAEL    Inject intramuscularly as directed       fluticasone (FLOVENT HFA) 110 mcg/actuation inhaler Inhale 2 puffs 2 (two) times a day. 1 Inhaler 1     GLUCOSAMINE HCL/MSM (GLUCOSAMINE MSM ORAL) Take 1 tablet by mouth.       hydrocortisone-pramoxine (ANALPRAM-HC) 2.5-1 % rectal cream        loratadine (CLARITIN) 10 mg tablet Take 10 mg by mouth as needed for allergies.       montelukast (SINGULAIR) 10 mg tablet Take 1 tablet (10 mg total) by mouth bedtime. 30 tablet 5     predniSONE (DELTASONE) 20 MG tablet Take 3 tablets (60 mg total) by mouth daily. 15 tablet 0     tacrolimus (PROTOPIC) 0.03 % ointment   4     terbinafine HCl (LAMISIL) 250 mg tablet   0     triamcinolone (KENALOG) 0.1 % ointment   1     Current Facility-Administered Medications   Medication Dose Route Frequency Provider Last Rate Last Dose     albuterol nebulizer solution 3 mL (PROVENTIL)  3 mL Nebulization Once Lisa PAKO Betancourt MD           Total time spent with patient was 20 minutes with >50% of time spent in face-to-face counseling regarding the above plan       Darryl Eubanks CNP (Rob)    10/13/2017

## 2021-06-13 NOTE — PROGRESS NOTES
Name: Lori J Foix  Age: 36 y.o.  Gender: female  : 1980  Date of Encounter: 2017      HPI:  Lori J Foix is a 36 y.o.  female with history of allergic rhinitis and distant past diagnosis of asthma as a child who presents to the clinic with concerns of cough for the last 2 weeks. Cough intermittently productive yellow phlegm.  Reports symptoms started with sneezing and a runny nose 2 weeks earlier. Reports intermittent wheezing and post nasal drip.  Has history of wheezing with allergies and has been prescribed an albuterol inhaler for symptoms of wheezing.  Last used her inhaler 3 hours earlier. When feeling well does not use inhaler on regular basis.  Uses an antihistamine and Singulair for allergies.  Is not sleeping well at night due to cough. Denies fever, chills, face pain and pressure,ear pain, sore throat, nausea, vomiting and skin rash.  Patient currently on day 3 of 5 of prednisone burst. Son was ill with URI and was prescribed antibiotic.  Patient reports son is better now after taking antibiotic and patient is still sick.    Current Medication:   Medications reviewed and updated.    Current Outpatient Prescriptions:      albuterol (PROAIR HFA;PROVENTIL HFA;VENTOLIN HFA) 90 mcg/actuation inhaler, Inhale 2 puffs every 6 (six) hours as needed for wheezing., Disp: 1 Inhaler, Rfl: 0     DESONIDE (DESONATE TOP), Apply topically., Disp: , Rfl:      EPINEPHrine (EPIPEN 2-SARAY) 0.3 mg/0.3 mL (1:1,000) atIn, MICHAEL   Inject intramuscularly as directed, Disp: , Rfl:      GLUCOSAMINE HCL/MSM (GLUCOSAMINE MSM ORAL), Take 1 tablet by mouth., Disp: , Rfl:      hydrocortisone-pramoxine (ANALPRAM-HC) 2.5-1 % rectal cream, , Disp: , Rfl:      loratadine (CLARITIN) 10 mg tablet, Take 10 mg by mouth as needed for allergies., Disp: , Rfl:      montelukast (SINGULAIR) 10 mg tablet, Take 1 tablet (10 mg total) by mouth bedtime., Disp: 30 tablet, Rfl: 5     predniSONE (DELTASONE) 20 MG tablet, Take 1 tablet (20 mg  total) by mouth 2 (two) times a day for 5 days., Disp: 10 tablet, Rfl: 0     tacrolimus (PROTOPIC) 0.03 % ointment, , Disp: , Rfl: 4     terbinafine HCl (LAMISIL) 250 mg tablet, , Disp: , Rfl: 0     triamcinolone (KENALOG) 0.1 % ointment, , Disp: , Rfl: 1    Current Facility-Administered Medications:      albuterol nebulizer solution 3 mL (PROVENTIL), 3 mL, Nebulization, Once, Dora Franco MD    Past Med / Surg History:  No past medical history on file.  No past surgical history on file.    Fam / Soc History:  No family history on file.  Social History     Social History     Marital status:      Spouse name: N/A     Number of children: N/A     Years of education: N/A     Occupational History     Not on file.     Social History Main Topics     Smoking status: Never Smoker     Smokeless tobacco: Never Used     Alcohol use Not on file     Drug use: Not on file     Sexual activity: Not on file     Other Topics Concern     Not on file     Social History Narrative       ROS:  14 point review of systems unremarkable except as mentioned in HPI    Objective:  Vitals: /80 (Patient Site: Right Arm, Patient Position: Sitting, Cuff Size: Adult Regular)  Pulse 98  Temp 98.9  F (37.2  C) (Oral)   Resp 18  Wt 163 lb (73.9 kg)  SpO2 97%  Breastfeeding? No  BMI 28.87 kg/m2    Gen: Alert, awake, well appearing  HEENT: NC, AT,   Ears:  Ear canals clear.  TMs normal appearing.  Eyes:  EOMI.  Pupils equally round and reactive to light. Conjunctivae clear.  Sclera non-icteric.  Nose:  Nasal mucosa boggy with clear rhinorrhea, septum midline.  No sinus tenderness  Mouth:  MMM. Oropharynx clear. No tonsillar exudate. Teeth, gum, tongue and lips clear.  Neck:  Supple, FROM, No lymphandenpathy, No TM  Heart: Regular rate and rhythm; normal S1 and S2; no murmurs, gallops, or rubs.  Peripheral Vessels: Normal pulses and perfusion.  Lungs: Unlabored respirations; symmetric chest expansion; clear breath sounds with  normal respiration but end expiratory wheezing with forced expiration.   Extremities: No clubbing, cyanosis, or edema. Normal upper and lower extremities.  Skin: Normal turgor and without lesions or rashes.  Mental Status: Alert, oriented, in no distress. Mood and affect appropriate..     Pertinent results / imaging:  none    Assessment:  1.bronchiti 2. wheezing    Plan:   Instructed on scheduled use of albuterol inhaler every 4 hours for the next couple of days for cough and wheezing until cough improves.  Continue allergy medications as prescribed.  Prescribed Azithromycin 5 day course and benzonatate as needed for cough.  Advised fluids, rest, humidified air, throat and cough lozenges and sleeping with head elevated.  Reviewed expected course and duration of symptoms and reason to return for reevaluation.  Patient voiced understanding and was in agreement with plan.       Rama Fernandez MD  9/29/2017

## 2021-06-16 NOTE — PROGRESS NOTES
Chief complaint: Follow-up allergy shots    History of present illness: This is a pleasant 37-year-old woman who is here today for follow-up of her allergy shots.  She has been on allergy shots since April 2014.  She did have some systemic reactions so she is held at 0.1 mL of 1: 1 red vial.  Last fall she had some difficulties with her asthma.  She was sick and she also has some high-dose exposure to cigarette smoke.  I had placed her on Dulera.  She states he stopped this 1-2 months because she has been feeling better.  ACT score today is 20.  She has no cough, wheeze or shortness of breath and has not needed her albuterol inhaler in quite some time.  Overall, her allergy symptoms have improved.  Less nasal congestion and drainage.  She uses Claritin regularly.    Past medical history, social history, family medical history, meds and allergies reviewed and updated accordingly.    Review of Systems performed as above and the remainder is negative.      Current Outpatient Prescriptions:      albuterol (PROAIR HFA;PROVENTIL HFA;VENTOLIN HFA) 90 mcg/actuation inhaler, Inhale 2 puffs every 6 (six) hours as needed for wheezing., Disp: 1 Inhaler, Rfl: 0     albuterol (PROVENTIL) 2.5 mg /3 mL (0.083 %) nebulizer solution, Take 3 mL (2.5 mg total) by nebulization every 4 (four) hours as needed for wheezing., Disp: 25 vial, Rfl: 0     DESONIDE (DESONATE TOP), Apply topically., Disp: , Rfl:      EPINEPHrine (EPIPEN 2-SARAY) 0.3 mg/0.3 mL (1:1,000) atIn, MICHAEL   Inject intramuscularly as directed, Disp: , Rfl:      GLUCOSAMINE HCL/MSM (GLUCOSAMINE MSM ORAL), Take 1 tablet by mouth., Disp: , Rfl:      hydrocortisone-pramoxine (ANALPRAM-HC) 2.5-1 % rectal cream, , Disp: , Rfl:      loratadine (CLARITIN) 10 mg tablet, Take 10 mg by mouth as needed for allergies., Disp: , Rfl:      mometasone-formoterol (DULERA) 200-5 mcg/actuation HFAA inhaler, Inhale 2 puffs 2 (two) times a day., Disp: 1 Inhaler, Rfl: 1     montelukast (SINGULAIR)  "10 mg tablet, Take 1 tablet (10 mg total) by mouth bedtime., Disp: 30 tablet, Rfl: 5     tacrolimus (PROTOPIC) 0.03 % ointment, , Disp: , Rfl: 4     terbinafine HCl (LAMISIL) 250 mg tablet, , Disp: , Rfl: 0     triamcinolone (KENALOG) 0.1 % ointment, , Disp: , Rfl: 1    Current Facility-Administered Medications:      albuterol nebulizer solution 3 mL (PROVENTIL), 3 mL, Nebulization, Once, Lisa Betancourt MD    No Known Allergies    /80  Pulse 80  Ht 5' 3\" (1.6 m)  Wt 169 lb (76.7 kg)  BMI 29.94 kg/m2        Gen: Pleasant female not in acute distress  HEENT: Eyes no erythema of the bulbar or palpebral conjunctiva, no edema. Ears: TMs well visualized, no effusions. Nose: No congestion, mucosa normal. Mouth: Throat clear, no lip or tongue edema.   Cardiac: Regular rate and rhythm, no murmurs, rubs or gallops  Respiratory: Clear to auscultation bilaterally, no adventitious breath sounds    Skin: No rashes or lesions  Psych: Alert and oriented times 3    Impression report and plan:  1.  Allergic rhinitis  2.  Mild intermittent asthma    Think the patient can complete allergy shots.  She has 1 more dose left and this file.  I went over the statistics with her regarding stopping allergy shots.  She will go for at least one season before restarting.  She will notify me if her asthma becomes active again.  For now, reviewed an asthma action plan.  Follow as needed.  "

## 2021-06-19 NOTE — PROGRESS NOTES
Assessment/Plan:   LLQ abdominal pain  Onset LLQ abdominal overnight.  Has been off and on all day. No fever or N/V.  No urinary sxs or vaginal sxs.  Mid-menstrual cycle. Increased pain after chiropractor visit today. Eating okay, no change in pain. Had vomiting x 1 last week with loose stools but normal BM today.  Family history of kidney stones. No CVA tenderness with percussion. Abdomen soft.  Tender with reproducible pain left lateral abdomen, no mass, no definite guarding, does not seem muscular. No rash or dysesthesia of overlying skin to suggest impending shingles. Increased pain with sitting up and lying down - movements increase pain. Consider ureteral calculi, pyelonephritis (no fever or CVA tenderness or urinary sxs and UA is clear), ectopic (UPT negative), ovarian cyst, PID (no vaginal sxs), diverticulitis, muscle strain (no apparent trigger or injury) or radiculitis.  CBC with mild WBC elevation. When she arrived 5 minutes before close of clinic I discussed with them the limitations of a visit here at this time of night and suggested the [facility] or [facility] since imaging would likely be needed. She refused because she didn't feel that it was necessary or urgent to do that tonight. She wanted to have scan ordered for tomorrow but I did not feel comfortable with that without a reevaluation.  I think she is stable to observe overnight and recheck here in [facility] in the morning to reevaluate and if indicated recheck labs and consider imaging such as a CT scan vs pelvic ultrasound. She has an appointment with primary at 4 tomorrow afternoon but I was not comfortable with her waiting that long unless she is improving.  We also discussed indications to go to the ER overnight and she agreed with this plan.      - HM1(CBC and Differential)  - Pregnancy (Beta-hCG, Qual), Urine  - Urinalysis-UC if Indicated  - HM1 (CBC with Diff)    [place], low fiber diet  Tylenol and ibuprofen as needed for pain  Drink  plenty of water  Recheck tomorrow - with primary clinic at afternoon appointment if doing well, otherwise in [facility] in the morning.    To the ER overnight if intolerable pain, vomiting, fever and chills.    Subjective:      Lori J Foix is a 37 y.o. female who presents with abdominal pain.  This came on during the night last night.  It is localized to the left lateral side and radiates to low back.  The pain anterolateral on the left is sharp and localized. Worse when she moved around, better lying on her left side. Today it has persisted off and on, sometimes forgetting about it altogether, sometimes sharp and severe.  She has had no fever, no N/V, no blood in urine or urgency or frequency of urination.  No vaginal discharge or abnormal bleeding. She is about mid cycle.  She had emesis x 1 a week ago followed by some loose stools as did her . Today she has been eating normally and this has had no impact on her pain. No GERD or nausea or loss of appetite. Today she had a more normal stool and felt better for about an hour after that. No rash, no injury or trauma or apparent strain. No prickly tingling or dysesthesia of the area. She went to a chiropractor appointment this afternoon for another chronic issue and that seemed to make things worse. Pain fluctuates from a 1 to 9 and is currently a 2 or 3 at rest and 6 when moving around. No trauma or injury or strain or change in activity prior to onset. She has past history of having had intussusception as a child and had a section of bowel resected where the small and large bowels join including the appendix.  No other history of bowel problems or known diverticuli.  She has family history of kidney stones - her dad and his sister.  She denies URI or cough or ST, no HA. NKDA    Current Outpatient Prescriptions on File Prior to Visit   Medication Sig Dispense Refill     albuterol (PROAIR HFA;PROVENTIL HFA;VENTOLIN HFA) 90 mcg/actuation inhaler Inhale 2  puffs every 6 (six) hours as needed for wheezing. 1 Inhaler 0     GLUCOSAMINE HCL/MSM (GLUCOSAMINE MSM ORAL) Take 1 tablet by mouth.       hydrocortisone-pramoxine (ANALPRAM-HC) 2.5-1 % rectal cream        loratadine (CLARITIN) 10 mg tablet Take 10 mg by mouth as needed for allergies.       tacrolimus (PROTOPIC) 0.03 % ointment   4     albuterol (PROVENTIL) 2.5 mg /3 mL (0.083 %) nebulizer solution Take 3 mL (2.5 mg total) by nebulization every 4 (four) hours as needed for wheezing. 25 vial 0     DESONIDE (DESONATE TOP) Apply topically.       EPINEPHrine (EPIPEN 2-SARAY) 0.3 mg/0.3 mL (1:1,000) atIn MICHAEL    Inject intramuscularly as directed       terbinafine HCl (LAMISIL) 250 mg tablet   0     triamcinolone (KENALOG) 0.1 % ointment   1     Current Facility-Administered Medications on File Prior to Visit   Medication Dose Route Frequency Provider Last Rate Last Dose     albuterol nebulizer solution 3 mL (PROVENTIL)  3 mL Nebulization Once Lisa Betancourt MD         Patient Active Problem List   Diagnosis     Allergic Rhinitis     Anxiety       Objective:     /80 (Patient Site: Right Arm, Patient Position: Sitting, Cuff Size: Adult Regular)  Pulse 90  Temp 98  F (36.7  C) (Oral)   Resp 20  Wt 172 lb 14.4 oz (78.4 kg)  LMP 06/27/2018 (Approximate)  SpO2 98%  Breastfeeding? No  BMI 30.63 kg/m2    Physical  General Appearance: Alert, cooperative, no distress.  AVSS  Head: Normocephalic, without obvious abnormality, atraumatic  Eyes: Conjunctivae are normal.   Nose: No significant congestion.  Lungs: Clear to auscultation bilaterally, respirations unlabored  Heart: Regular rate and rhythm  Abdomen: Soft, non-distended, bowel sounds active/normal, tender in a reproducible location just above the anterior superior iliac spine - sharp pain also triggered by sitting up and lying down. No CVA tenderness with percussion.  No rebound or definite guarding, no masses, no organomegaly.  No suprapubic tenderness.    Extremities: No lower extremity edema  Skin: Skin color, texture, turgor normal, no rashes or lesions  Psychiatric: Patient has a normal mood and affect.        Recent Results (from the past 24 hour(s))   Pregnancy (Beta-hCG, Qual), Urine   Result Value Ref Range    Pregnancy Test, Urine Negative Negative    Specific Gravity, UA <=1.005 1.001 - 1.030   Urinalysis-UC if Indicated   Result Value Ref Range    Color, UA Yellow Colorless, Yellow, Straw, Light Yellow    Clarity, UA Clear Clear    Glucose, UA Negative Negative    Bilirubin, UA Negative Negative    Ketones, UA Negative Negative    Specific Gravity, UA <=1.005 1.005 - 1.030    Blood, UA Trace (!) Negative    pH, UA 6.0 5.0 - 8.0    Protein, UA Negative Negative mg/dL    Urobilinogen, UA 0.2 E.U./dL 0.2 E.U./dL, 1.0 E.U./dL    Nitrite, UA Negative Negative    Leukocytes, UA Negative Negative    Bacteria, UA None Seen None Seen hpf    RBC, UA None Seen None Seen, 0-2 hpf    WBC, UA None Seen None Seen, 0-5 hpf    Squam Epithel, UA 0-5 None Seen, 0-5 lpf   HM1 (CBC with Diff)   Result Value Ref Range    WBC 12.8 (H) 4.0 - 11.0 thou/uL    RBC 4.69 3.80 - 5.40 mill/uL    Hemoglobin 13.7 12.0 - 16.0 g/dL    Hematocrit 41.1 35.0 - 47.0 %    MCV 88 80 - 100 fL    MCH 29.2 27.0 - 34.0 pg    MCHC 33.4 32.0 - 36.0 g/dL    RDW 11.7 11.0 - 14.5 %    Platelets 429 140 - 440 thou/uL    MPV 7.0 7.0 - 10.0 fL    Neutrophils % 66 50 - 70 %    Lymphocytes % 23 20 - 40 %    Monocytes % 9 2 - 10 %    Eosinophils % 2 0 - 6 %    Basophils % 1 0 - 2 %    Neutrophils Absolute 8.4 (H) 2.0 - 7.7 thou/uL    Lymphocytes Absolute 2.9 0.8 - 4.4 thou/uL    Monocytes Absolute 1.1 (H) 0.0 - 0.9 thou/uL    Eosinophils Absolute 0.2 0.0 - 0.4 thou/uL    Basophils Absolute 0.1 0.0 - 0.2 thou/uL

## 2021-06-19 NOTE — PROGRESS NOTES
Assessment:     1. LLQ abdominal pain  CT Abdomen Pelvis With Oral Without IV Contrast     Ct Abdomen Pelvis Without Oral Without Iv Contrast    Result Date: 7/12/2018  CT ABDOMEN PELVIS WO ORAL WO IV CONTRAST 7/12/2018 10:21 AM INDICATION: Llq pain r/o kidney stones, cyst or diverticulitis llq pain TECHNIQUE: Routine CT abdomen and pelvis without oral or IV contrast. Multiplanar reformation images (MPR). Dose reduction techniques were used. COMPARISON: None. FINDINGS: LUNG BASES: Negative. ABDOMEN: Limited due to lack of IV contrast. Unremarkable unenhanced liver, gallbladder, bile ducts, spleen, pancreas and adrenals. Unremarkable right kidney. 2.2 cm exophytic lower left renal cyst. Also 1.6 m upper left renal cortical cyst. No evidence of hydronephrosis. The visualized ureters are normal in caliber. No urinary tract calculus identified. Unremarkable stomach. Mildly dilated left lower quadrant small bowel segments measuring up to 3.0 cm in diameter. No discrete transition zone is identified. No adjacent inflammatory changes or associated wall thickening. Appendix not definitely visualized. Mild colonic diverticulosis. No evidence of diverticulitis. Within the left lower quadrant image 74 series 2 there is a 10 mm nodular structure which has partial fat density. No free fluid, free air or adenopathy. PELVIS: IUD noted within the uterus. Collapsed grossly unremarkable urinary bladder. No free fluid or adenopathy. MUSCULOSKELETAL: Negative.     CONCLUSION: 1.  10 mm left lower quadrant nodule suspected to reflect sequelae of an omental infarct/fat necrosis. 2.  Short segment of mildly dilated small bowel within the left lower quadrant favored to reflect a mild ileus rather than an obstruction. 3.  No urinary tract calculus or evidence of hydronephrosis. 4.  Colonic diverticulosis without evidence of acute diverticulitis. A phone call report regarding the critical findings on this exam was made to nurse practitioner  Dolly Shepherdland at 10:48 AM on 07/12/2018.       Plan:     Differential diagnosis include but not limited to renal calculi due to patient family history father and her sister.  Ovarian cyst, or diverticulitis.  Discussed results with the patient, negative for renal calculi or hydronephrosis, patient noted to have colonic diverticulosis without evidence of acute diverticulitis.  Advised patient to continue with the recommended diet, monitor for worsening symptoms, may take ibuprofen or Tylenol for pain.  If her symptoms worsen patient need to go to the emergency room for further evaluation.  Patient verbalized understanding the plan of care.    Subjective:       37 y.o. female presents for evaluation of left lower quadrant pain.  Patient reports that the pain started yesterday, she was seen here at the walk-in clinic last night but due to time imaging was not ordered, therefore she is here to have imaging ordered.  She reports that the pain continues to be on and off.  She noticed pain about 2 AM, overnight her pain was okay, she took Tylenol about 9 PM but nothing else since then.  She noticed this morning on her drive here she had increased pain.  She denies change in appetite,  denies nausea, vomiting, or diarrhea.  Pain level is between 5-9 out of 10.    The following portions of the patient's history were reviewed and updated as appropriate: allergies, current medications, past family history, past medical history, past social history, past surgical history and problem list.    Review of Systems  A 12 point comprehensive review of systems was negative except as noted.     Objective:      /80 (Patient Site: Right Arm, Patient Position: Sitting, Cuff Size: Adult Regular)  Pulse 86  Temp 98.6  F (37  C) (Oral)   Resp 18  Wt 172 lb (78 kg)  LMP 06/27/2018 (Approximate)  SpO2 98%  Breastfeeding? No  BMI 30.47 kg/m2  General appearance: alert, appears stated age, cooperative and mild  distress  Head: Normocephalic, without obvious abnormality, atraumatic  Eyes: conjunctivae/corneas clear. PERRL, EOM's intact. Fundi benign.  Ears: normal TM's and external ear canals both ears  Back: no tenderness to percussion or palpation, symmetric, no curvature. ROM normal. No CVA tenderness.  Lungs: clear to auscultation bilaterally  Heart: regular rate and rhythm, S1, S2 normal, no murmur, click, rub or gallop  Abdomen: abnormal findings:  marked tenderness in the LLQ and and suprapubic area.  Pain is reproducible  Skin: Skin color, texture, turgor normal. No rashes or lesions  Lymph nodes: Cervical, supraclavicular, and axillary nodes normal.  Neurologic: Grossly normal     This note has been dictated using voice recognition software. Any grammatical or context distortions are unintentional and inherent to the software

## 2021-06-22 NOTE — PROGRESS NOTES
Name: Lori J Foix  Age: 38 y.o.  Gender: female  : 1980  Date of Encounter: 2018      HPI:  Lori J Foix is a 38 y.o.  female with history of mild intermittent asthma and allergies who presents to the clinic with complaints of sore throat.  Started yesterday.  Patient reports pain is radiating along the sides of her neck and up into her ears with associated congestion.  She has lingering intermittent cough from an upper respiratory infection approximately 2-3 weeks ago.  During that time she was using her inhaler.  Last used her inhaler 2 weeks ago.  Denies fever, chills, body aches, headache, wheezing, shortness of breath, nausea, vomiting, abdominal pain and skin rash.  Appetite has been good and energy has been normal.  Patient has not used over-the-counter medication or home remedies for her symptoms.    Current Medication:   Medications reviewed and updated.    Current Outpatient Medications:      albuterol (PROVENTIL) 2.5 mg /3 mL (0.083 %) nebulizer solution, Take 3 mL (2.5 mg total) by nebulization every 4 (four) hours as needed for wheezing., Disp: 25 vial, Rfl: 0     calcium-magnesium 300-300 mg Tab, Take by mouth., Disp: , Rfl:      DESONIDE (DESONATE TOP), Apply topically., Disp: , Rfl:      EPINEPHrine (EPIPEN 2-SARAY) 0.3 mg/0.3 mL (1:1,000) atIn, MICHAEL   Inject intramuscularly as directed, Disp: , Rfl:      GLUCOSAMINE HCL/MSM (GLUCOSAMINE MSM ORAL), Take 1 tablet by mouth., Disp: , Rfl:      hydrocortisone-pramoxine (ANALPRAM-HC) 2.5-1 % rectal cream, , Disp: , Rfl:      loratadine (CLARITIN) 10 mg tablet, Take 10 mg by mouth as needed for allergies., Disp: , Rfl:      tacrolimus (PROTOPIC) 0.03 % ointment, , Disp: , Rfl: 4     terbinafine HCl (LAMISIL) 250 mg tablet, , Disp: , Rfl: 0     triamcinolone (KENALOG) 0.1 % ointment, , Disp: , Rfl: 1     VENTOLIN HFA 90 mcg/actuation inhaler, INHALE 2 PUFFS EVERY 6 (SIX) HOURS AS NEEDED FOR WHEEZING., Disp: 18 Inhaler, Rfl: 0    Current  Facility-Administered Medications:      albuterol nebulizer solution 3 mL (PROVENTIL), 3 mL, Nebulization, Once, Lisa Betancourt MD    Past Med / Surg History:  No past medical history on file.  No past surgical history on file.    Fam / Soc History:  No family history on file.  Social History     Socioeconomic History     Marital status:      Spouse name: Not on file     Number of children: Not on file     Years of education: Not on file     Highest education level: Not on file   Social Needs     Financial resource strain: Not on file     Food insecurity - worry: Not on file     Food insecurity - inability: Not on file     Transportation needs - medical: Not on file     Transportation needs - non-medical: Not on file   Occupational History     Not on file   Tobacco Use     Smoking status: Never Smoker     Smokeless tobacco: Never Used   Substance and Sexual Activity     Alcohol use: Not on file     Drug use: Not on file     Sexual activity: Not on file   Other Topics Concern     Not on file   Social History Narrative     Not on file       ROS:  14 point review of systems unremarkable except as mentioned in HPI  Review of Systems   Constitutional: Negative for appetite change, chills, fatigue and fever.   HENT: Positive for congestion and sore throat. Negative for ear pain, rhinorrhea, sinus pressure and sinus pain.    Eyes: Negative for discharge and redness.   Respiratory: Positive for cough. Negative for chest tightness, shortness of breath and wheezing.    Gastrointestinal: Negative for abdominal pain, nausea and vomiting.   Musculoskeletal: Negative for myalgias.   Neurological: Negative for headaches.       Objective:  Vitals: /60 (Patient Site: Right Arm, Patient Position: Sitting, Cuff Size: Adult Regular)   Pulse 100   Temp 98.4  F (36.9  C) (Oral)   Resp 18   Wt 171 lb (77.6 kg)   SpO2 99%   Breastfeeding? No   BMI 30.29 kg/m      Gen: Alert, awake, well appearing  HEENT: NC, AT,    Ears:  Ear canals clear.  TMs normal appearing.  Eyes:  EOMI.  Pupils equally round and reactive to light. Conjunctivae clear.  Sclera non-icteric.  Nose:  Nasal mucosa boggy, septum midline.  No sinus tenderness  Mouth:  MMM. Oropharynx erythematous. No tonsillar exudate. Teeth, gum, tongue and lips clear.  Neck:  Supple, FROM, No lymphandenpathy, No TM  Heart: Regular rate and rhythm; normal S1 and S2; no murmurs, gallops, or rubs.  Peripheral Vessels: Normal pulses and perfusion.  Lungs: Unlabored respirations; symmetric chest expansion; clear breath sounds.  Extremities: No clubbing, cyanosis, or edema. Normal upper and lower extremities.  Skin: Normal turgor and without lesions or rashes.  Mental Status: Alert, oriented, in no distress. Mood and affect appropriate.  Neuro: Normal tone; no focal deficits appreciated.Gait and stance normal.     Pertinent results / imaging:  Results for orders placed or performed in visit on 12/21/18   Rapid Strep A Screen- Throat Swab   Result Value Ref Range    Rapid Strep A Antigen No Group A Strep detected, presumptive negative No Group A Strep detected, presumptive negative       Assessment: 1.  Pharyngitis    Plan: Reviewed negative RST results.  Informed follow-up PCR strep test is pending and she will be notified only if results are positive.  If follow-up test is negative most probably viral illness.  Discussed symptomatic cares.  Reviewed expected course, duration of symptoms and reasons to return for reevaluation.  Patient voiced understanding and was in agreement with plan.      Rama Fernandez MD  12/21/2018

## 2021-06-22 NOTE — PROGRESS NOTES
Assessment:     1. Acute serous otitis media of left ear, recurrence not specified  amoxicillin-clavulanate (AUGMENTIN) 875-125 mg per tablet          Plan:     Differential diagnosis include but not limited to upper respiratory infection, otalgia, otitis media.  Discussed with the patient on physical exam finding, patient with left otitis media, inflamed tympanic membrane with purulent middle ear fluid.  We will treat for otitis media with Augmentin twice daily times 10 days.  Advised patient to take ibuprofen or Tylenol at least 600 mg every 6 hours as needed for pain or discomfort.  Monitor for worsening symptoms.  May follow-up with a PCP if symptoms does not resolve.  Patient verbalized understanding the plan of care.    Subjective:       38 y.o. female presents for evaluation of left ear pain.  Patient reports that her ear pain started about 2 hours ago, over the weekend she noticed that her ears were plugged and felt like it was full of fluids.  She has not had any fever, she has had a cough for little over 1 week.  She denies vomiting but admits to loose stools but not diarrhea.  She admits that her pain is sometimes sharp, feels like a cotton ball has been shaft in her is too hard.  This morning she tried cleaning her ears using a Q-tip and that did not give her any symptom relief.    The following portions of the patient's history were reviewed and updated as appropriate: allergies, current medications, past family history, past medical history, past social history, past surgical history and problem list.    Review of Systems  A 12 point comprehensive review of systems was negative except as noted.      Objective:      /53   Pulse (!) 106   Temp 98.3  F (36.8  C) (Oral)   Wt 178 lb (80.7 kg)   SpO2 98%   BMI 31.53 kg/m    General appearance: alert, appears stated age, cooperative and moderate distress  Head: Normocephalic, without obvious abnormality, atraumatic, sinuses nontender to  percussion  Eyes: conjunctivae/corneas clear. PERRL, EOM's intact. Fundi benign.  Ears: abnormal TM right ear - bulging and air-fluid level and abnormal TM left ear - erythematous, bulging, air-fluid level and purulent middle ear fluid  Nose: Nares normal. Septum midline. Mucosa normal. No drainage or sinus tenderness., clear discharge, moderate congestion  Throat: lips, mucosa, and tongue normal; teeth and gums normal  Lungs: clear to auscultation bilaterally  Heart: regular rate and rhythm, S1, S2 normal, no murmur, click, rub or gallop  Extremities: extremities normal, atraumatic, no cyanosis or edema  Pulses: 2+ and symmetric  Skin: Skin color, texture, turgor normal. No rashes or lesions  Lymph nodes: Cervical, supraclavicular, and axillary nodes normal.  Neurologic: Grossly normal     This note has been dictated using voice recognition software. Any grammatical or context distortions are unintentional and inherent to the software

## 2021-06-22 NOTE — PROGRESS NOTES
ASSESSMENT:   1. OME (otitis media with effusion), left         PLAN:  38-year-old female presents for evaluation of a sensation of left ear fullness, patient was seen on the 27th and diagnosed with otitis media with an effusion.  Has only taken 4 doses of Augmentin.  Concerned that it is not working.  There is an effusion present in the left ear, mildly erythematous.  From review of previous clinic note, does appear to be improving.  Discussed with patient that symptoms of a middle ear effusion can persist for a month to 6 weeks.  Will follow up with primary care or ENT should symptoms persist.    I discussed red flag symptoms, return precautions to clinic/ER and follow up care with patient/parent.  Expected clinical course, symptomatic cares advised. Questions answered. Patient/parent amenable with plan.    Patient Instructions:  Patient Instructions   Continue Augmentin and Flonase.  If no improvement by the end of the course of antibiotics, see your PCP or ENT for further eval but know that symptoms of fluid behind the ear can be present for a month to 6 weeks.      SUBJECTIVE:   Lori J Foix is a 38 y.o. female who presents today with left ear fullness and pain, was seen on 12/27 and dx with LOM, has taken only 4 doses of Augmentin and is concerned because she still feels the full sensation.  Did not take Augmentin this morning as she believes it is not working.  Also has URI symptoms, began using Flonase several days ago.  No fevers, dizziness, nausea, vomiting.      ROS:  Comprehensive 12 pt ROS completed, positives noted in HPI, otherwise negative.      Past Medical History:  Patient Active Problem List   Diagnosis     Allergic Rhinitis     Anxiety       Surgical History:  No past surgical history on file.        Family History:  No family history on file.    Reviewed; Non-contributory    Social History     Tobacco Use   Smoking Status Never Smoker   Smokeless Tobacco Never Used         Current  Medications:  Current Outpatient Medications on File Prior to Visit   Medication Sig Dispense Refill     albuterol (PROVENTIL) 2.5 mg /3 mL (0.083 %) nebulizer solution Take 3 mL (2.5 mg total) by nebulization every 4 (four) hours as needed for wheezing. 25 vial 0     amoxicillin-clavulanate (AUGMENTIN) 875-125 mg per tablet Take 1 tablet by mouth 2 (two) times a day for 10 days. 20 tablet 0     calcium-magnesium 300-300 mg Tab Take by mouth.       DESONIDE (DESONATE TOP) Apply topically.       EPINEPHrine (EPIPEN 2-SARAY) 0.3 mg/0.3 mL (1:1,000) atIn MICHAEL    Inject intramuscularly as directed       GLUCOSAMINE HCL/MSM (GLUCOSAMINE MSM ORAL) Take 1 tablet by mouth.       hydrocortisone-pramoxine (ANALPRAM-HC) 2.5-1 % rectal cream        loratadine (CLARITIN) 10 mg tablet Take 10 mg by mouth as needed for allergies.       tacrolimus (PROTOPIC) 0.03 % ointment   4     terbinafine HCl (LAMISIL) 250 mg tablet   0     triamcinolone (KENALOG) 0.1 % ointment   1     VENTOLIN HFA 90 mcg/actuation inhaler INHALE 2 PUFFS EVERY 6 (SIX) HOURS AS NEEDED FOR WHEEZING. 18 Inhaler 0     Current Facility-Administered Medications on File Prior to Visit   Medication Dose Route Frequency Provider Last Rate Last Dose     albuterol nebulizer solution 3 mL (PROVENTIL)  3 mL Nebulization Once Lisa Betancourt MD           Allergies:   No Known Allergies    OBJECTIVE:   Vitals:    12/29/18 0904   BP: 102/78   Patient Site: Right Arm   Patient Position: Sitting   Cuff Size: Adult Regular   Pulse: (!) 106   Temp: 98.2  F (36.8  C)   TempSrc: Oral   SpO2: 98%   Weight: 175 lb 8 oz (79.6 kg)     Physical exam reveals a pleasant 38 y.o. female.   Appears healthy, alert and cooperative. Non-toxic appearance.  Eyes:  No conjunctivitis, lids normal.   Ears:  Normal appearing auricle. External auditory meatus without excessive cerumen, edema or erythema. right TM dull and serous middle ear fluid, left TM mildly erythematous, effusion noted and normal  canals bilaterally.  No mastoid tenderness. No pain with palpation over tragus.  Nose:    Mucosa normal. No rhinorrhea. No sinus tenderness.  Mouth:  Mucosa pink and moist.  no pharyngitis, no exudate. No trismus. No evidence of PTA. Normal phonation.  Neck: no adenopathy  Lungs: Chest is clear, no wheezing, rhonchi or rales. Symmetric air entry throughout both lung fields.  Heart: mild tachycardia,regular rhythm, no murmur, rub or gallop  Abdomen: soft, nontender. No masses or organomegaly  Skin: pink, warm, dry, and without lesions on limited skin exam. No rashes.       RADIOLOGY    none  LABORATORY STUDIES    none      Gifty Khalil, IVY

## 2021-06-26 ENCOUNTER — HEALTH MAINTENANCE LETTER (OUTPATIENT)
Age: 41
End: 2021-06-26

## 2021-10-16 ENCOUNTER — HEALTH MAINTENANCE LETTER (OUTPATIENT)
Age: 41
End: 2021-10-16

## 2022-07-17 ENCOUNTER — HEALTH MAINTENANCE LETTER (OUTPATIENT)
Age: 42
End: 2022-07-17

## 2022-09-25 ENCOUNTER — HEALTH MAINTENANCE LETTER (OUTPATIENT)
Age: 42
End: 2022-09-25

## 2023-08-05 ENCOUNTER — HEALTH MAINTENANCE LETTER (OUTPATIENT)
Age: 43
End: 2023-08-05

## 2024-03-02 ENCOUNTER — HEALTH MAINTENANCE LETTER (OUTPATIENT)
Age: 44
End: 2024-03-02

## 2025-08-29 ENCOUNTER — LAB REQUISITION (OUTPATIENT)
Dept: LAB | Facility: CLINIC | Age: 45
End: 2025-08-29

## 2025-08-29 DIAGNOSIS — Z13.6 ENCOUNTER FOR SCREENING FOR CARDIOVASCULAR DISORDERS: ICD-10-CM

## 2025-08-30 LAB
CHOLEST SERPL-MCNC: 191 MG/DL
FASTING STATUS PATIENT QL REPORTED: NO
HDLC SERPL-MCNC: 68 MG/DL
LDLC SERPL CALC-MCNC: 68 MG/DL
NONHDLC SERPL-MCNC: 123 MG/DL
TRIGL SERPL-MCNC: 277 MG/DL